# Patient Record
Sex: FEMALE | Race: WHITE
[De-identification: names, ages, dates, MRNs, and addresses within clinical notes are randomized per-mention and may not be internally consistent; named-entity substitution may affect disease eponyms.]

---

## 2017-01-27 ENCOUNTER — HOSPITAL ENCOUNTER (OUTPATIENT)
Dept: HOSPITAL 45 - C.LABBC | Age: 72
Discharge: HOME | End: 2017-01-27
Attending: INTERNAL MEDICINE
Payer: COMMERCIAL

## 2017-01-27 DIAGNOSIS — D64.9: Primary | ICD-10-CM

## 2017-01-27 LAB
ALBUMIN/GLOB SERPL: 1.3 {RATIO} (ref 0.9–2)
ALP SERPL-CCNC: 62 U/L (ref 45–117)
ALT SERPL-CCNC: 23 U/L (ref 12–78)
ANION GAP SERPL CALC-SCNC: 9 MMOL/L (ref 3–11)
AST SERPL-CCNC: 16 U/L (ref 15–37)
BASOPHILS # BLD: 0.01 K/UL (ref 0–0.2)
BASOPHILS NFR BLD: 0.3 %
BUN SERPL-MCNC: 18 MG/DL (ref 7–18)
BUN/CREAT SERPL: 21.6 (ref 10–20)
CALCIUM SERPL-MCNC: 9.5 MG/DL (ref 8.5–10.1)
CHLORIDE SERPL-SCNC: 105 MMOL/L (ref 98–107)
CO2 SERPL-SCNC: 25 MMOL/L (ref 21–32)
COMPLETE: YES
CREAT SERPL-MCNC: 0.81 MG/DL (ref 0.6–1.2)
EOSINOPHIL NFR BLD AUTO: 178 K/UL (ref 130–400)
FERRITIN SERPL-MCNC: 53.9 NG/ML (ref 8–388)
GLOBULIN SER-MCNC: 3.3 GM/DL (ref 2.5–4)
GLUCOSE SERPL-MCNC: 92 MG/DL (ref 70–99)
HCT VFR BLD CALC: 34.9 % (ref 37–47)
IG%: 0 %
IMM GRANULOCYTES NFR BLD AUTO: 34.9 %
LYMPHOCYTES # BLD: 1.32 K/UL (ref 1.2–3.4)
MCH RBC QN AUTO: 29.5 PG (ref 25–34)
MCHC RBC AUTO-ENTMCNC: 33 G/DL (ref 32–36)
MCV RBC AUTO: 89.5 FL (ref 80–100)
MONOCYTES NFR BLD: 6.1 %
NEUTROPHILS # BLD AUTO: 2.1 %
NEUTROPHILS NFR BLD AUTO: 56.6 %
PMV BLD AUTO: 9.4 FL (ref 7.4–10.4)
POTASSIUM SERPL-SCNC: 4.2 MMOL/L (ref 3.5–5.1)
RBC # BLD AUTO: 3.9 M/UL (ref 4.2–5.4)
SODIUM SERPL-SCNC: 139 MMOL/L (ref 136–145)
TIBC SERPL-MCNC: 368 MCG/DL (ref 250–450)
WBC # BLD AUTO: 3.78 K/UL (ref 4.8–10.8)

## 2017-01-31 NOTE — CODING QUERY MEDICAL NECESSITY
SUPPORTING DIAGNOSIS NEEDED





A supporting diagnosis is required for the test/procedure performed on this patient in 
order for us to be reimbursed by the patient's insurance. Please provide a supporting 
diagnosis for the following test/procedure listed below next to the test name along with 
your signature. 



*If there is no additional diagnosis for this patient that would support the following 
test/procedure please document that below next to the test/procedure.



Test(s)/Procedure(s) that require a supporting diagnosis:

DOS 1/27

* Vitamin B12      DIAGNOSIS:



Provider Signature:  ______________________________  Date:  _______



Thank you  

Penelope Solano

Health Information Management

Phone:  [phone]

Fax:  [phone]



Once completed, please kindly fax back to [phone]



For questions please call [phone]

## 2017-02-21 ENCOUNTER — HOSPITAL ENCOUNTER (OUTPATIENT)
Dept: HOSPITAL 45 - C.LABBC | Age: 72
Discharge: HOME | End: 2017-02-21
Attending: NURSE PRACTITIONER
Payer: COMMERCIAL

## 2017-02-21 DIAGNOSIS — R39.15: ICD-10-CM

## 2017-02-21 DIAGNOSIS — R31.29: Primary | ICD-10-CM

## 2017-02-28 ENCOUNTER — HOSPITAL ENCOUNTER (OUTPATIENT)
Dept: HOSPITAL 45 - C.LAB | Age: 72
Discharge: HOME | End: 2017-02-28
Attending: INTERNAL MEDICINE
Payer: COMMERCIAL

## 2017-02-28 DIAGNOSIS — K62.5: ICD-10-CM

## 2017-02-28 DIAGNOSIS — D64.9: Primary | ICD-10-CM

## 2017-02-28 LAB
BASOPHILS # BLD: 0 K/UL (ref 0–0.2)
BASOPHILS NFR BLD: 0 %
COMPLETE: YES
EOSINOPHIL NFR BLD AUTO: 160 K/UL (ref 130–400)
HCT VFR BLD CALC: 34.1 % (ref 37–47)
IG%: 0.2 %
IMM GRANULOCYTES NFR BLD AUTO: 31.7 %
LYMPHOCYTES # BLD: 1.29 K/UL (ref 1.2–3.4)
MCH RBC QN AUTO: 29.8 PG (ref 25–34)
MCHC RBC AUTO-ENTMCNC: 33.4 G/DL (ref 32–36)
MCV RBC AUTO: 89.3 FL (ref 80–100)
MONOCYTES NFR BLD: 6.6 %
NEUTROPHILS # BLD AUTO: 2 %
NEUTROPHILS NFR BLD AUTO: 59.5 %
PMV BLD AUTO: 8.7 FL (ref 7.4–10.4)
RBC # BLD AUTO: 3.82 M/UL (ref 4.2–5.4)
WBC # BLD AUTO: 4.07 K/UL (ref 4.8–10.8)

## 2017-03-02 ENCOUNTER — HOSPITAL ENCOUNTER (OUTPATIENT)
Dept: HOSPITAL 45 - C.GI | Age: 72
Discharge: HOME | End: 2017-03-02
Attending: INTERNAL MEDICINE
Payer: COMMERCIAL

## 2017-03-02 VITALS
HEIGHT: 65.98 IN | WEIGHT: 138.3 LBS | WEIGHT: 138.3 LBS | HEIGHT: 65.98 IN | BODY MASS INDEX: 22.23 KG/M2 | BODY MASS INDEX: 22.23 KG/M2

## 2017-03-02 VITALS — DIASTOLIC BLOOD PRESSURE: 72 MMHG | HEART RATE: 73 BPM | SYSTOLIC BLOOD PRESSURE: 157 MMHG | OXYGEN SATURATION: 100 %

## 2017-03-02 DIAGNOSIS — Z98.51: ICD-10-CM

## 2017-03-02 DIAGNOSIS — Z79.82: ICD-10-CM

## 2017-03-02 DIAGNOSIS — Z98.890: ICD-10-CM

## 2017-03-02 DIAGNOSIS — Z88.2: ICD-10-CM

## 2017-03-02 DIAGNOSIS — K52.0: Primary | ICD-10-CM

## 2017-03-02 DIAGNOSIS — Z83.79: ICD-10-CM

## 2017-03-02 NOTE — DISCHARGE INSTRUCTIONS
Endoscopy Patient Instructions


Date / Procedure(s) Performed


Mar 2, 2017.


Colonoscopy





Allergy Information


Coded Allergies:  


     Sulfa Antibiotics (Verified  Allergy, Unknown, HIVES AS A CHILD, 3/1/17)





Discharge Date / Findings


Mar 2, 2017.


Radiation induced Proctitis





Medication Instructions


Stopped Medication(s):  


ASA


Restart Stopped Medication(s):


OK to resume all medications today as prescribed


 Reported Home Medications








 Medications  Dose


 Route/Sig


 Max Daily Dose Days Date Category Dose


Instructions


 


 Vitamin D3


  (Cholecalciferol)


 2,000 Unit Cap  1 Cap


 PO QPM


   90 3/1/17 Reported 


 


 Stool Softener


  (Sennosides-Docusate


 Sodium) 1 Tab Tab  1 Tab


 PO QPM


    3/1/17 Reported  RECENTLY STOPPED TAKING


 


 Probiotic


  (Probiotic


 Product) 1 Cap Cap  1 Cap


 PO QPM


    3/1/17 Reported 


 


 Multivitamin


  (Multivitamins)


 Tab  1 Tab


 PO QPM


    3/1/17 Reported  RECENTLY STOPPED TAKING


 


 Iron (Ferrous


 Sulfate) 325 Mg


 Tab  1 Tab


 PO QPM


    3/1/17 Reported  RECENTLY STOPPED TAKING


 


 Aspirin Chewable


  (Aspirin) 81 Mg


 Chew  81 Mg


 PO QPM


    3/1/17 Reported  RECENTLY STOPPED TAKING


 


 Zocor


  (Simvastatin) 20


 Mg Tab  20 Mg


 PO QPM


    3/1/17 Reported 











Provider Instructions





Activity Restrictions





-  No exercising or heavy lifting for 24 hours. 


-  Do not drink alcohol the day of the procedure.


-  Do not drive a car or operate machinery until the day after the procedure.


-  Do not make any important decisions or sign important papers in 24 hours 

after the procedure.





Following Day:





-  Return to full activity which may include returning to work/school.





Diet





Start your diet with liquids and light foods (jello, soup, juice, toast).  Then 

eat your usual diet if not nauseated.





Treatment For Common After Affects





For mild abdominal pain, bloating, or excessive gas:





-  Rest


-  Eat lightly


-  Lie on right side





Follow-Up Information


Follow-up with Otilio Bowden as scheduled





Anesthesia Information





What You Should Know





You have had a procedure that required some medicine to reduce anxiety and 

discomfort. This treatment is called moderate sedation.  


After receiving the treatment, you may be sleepy, but you will be able to 

breathe on your own.  The effects of the treatment may last for several hours.








Follow these instructions along with Activity/Diet recommendations noted above:





*  Do NOT do anything where dizziness or clumsiness would be dangerous.





*  Rest quietly at home today, then you can be up and about tomorrow.





*  Have a responsible person stay with you the rest of today.





*  You may have had an I.V. today.  If so, you may take the dressing off later 

today.





Recommendations


 


Call your doctor if:





*  Trouble breathing 





*  Continuous vomiting for more than 24 hours





*  Temperature above 101 degrees





*  Severe abdominal pain or bloating





*  Pain not relieved by pain medicine ordered





*  There is increased drainage or redness from any incision





*  A large amount of rectal bleeding greater than 2-3 tablespoons. 


   (If you had a polyp/s removed or have hemorrhoids, a small amount of blood -


    from the rectum is to be expected.)





*  You have any unanswered questions or concerns.





IN THE EVENT OF A SERIOUS EMERGENCY, GO TO THE NEAREST EMERGENCY ROOM





       Your discharge instructions were prepared by provider Aristeo Ewing.


 Patient Instructions Signature Page








Melba Baker 











Patient (or Guardian) Signature/Date:____________________________________ I 

have read and understand the instructions given to me by my caregivers.








Caregiver/RN/Doctor Signature/Date:____________________________________








The above-named patient and/or guardian has received patient instructions on 

this date.


























+  Original Patient Signature Page (only) stays with chart.  Please make copy 

for patient.

## 2017-03-02 NOTE — ANESTHESIOLOGY PROGRESS NOTE
Anesthesia Post Op Note


Date & Time


Mar 2, 2017 at 17:12





Vital Signs





 Vital Signs Past 12 Hours








  Date Time  Temp Pulse Resp B/P Pulse Ox O2 Delivery O2 Flow Rate FiO2


 


3/2/17 17:01  70 16 161/67 100 Nasal Cannula 4 


 


3/2/17 15:25 36.6 86 16 145/81 100 Room Air  











Notes


Mental Status:  alert / awake / arousable, participated in evaluation


Pt Amnestic to Procedure:  Yes


Nausea / Vomiting:  adequately controlled


Pain:  adequately controlled


Airway Patency, RR, SpO2:  stable & adequate


BP & HR:  stable & adequate


Hydration State:  stable & adequate


Anesthetic Complications:  no major complications apparent

## 2017-03-02 NOTE — ENDO HISTORY AND PHYSICAL
History & Physical


Date of Service:


Mar 2, 2017.


Chief Complaint:


Rectal bleeding


Referring Physician:


Otilio Bowden


History of Present Illness


73 yo CF who presents for colonoscopy secondary to rectal bleeding.





Past Surgical History


Hx Cardiac Surgery:  No


Hx Internal Defibrillator:  No


Hx Pacemaker:  No


Hx Abdominal Surgery:  Yes (STIVEN, TUBAL LIGATION)


Hx of Implantable Prosthesis:  No


Hx Post-Op Nausea and Vomiting:  No


Hx Cancer Surgery:  No


Hx Thoracic Surgery:  No


Hx Orthopedic:  No


Hx Urinary Tract Surgery:  No





Family History


IBD





Social History


Hx Substance Use:  No


Hx Alcohol Use:  Yes (OCCASSIONALLY)





Allergies


Coded Allergies:  


     Sulfa Antibiotics (Verified  Allergy, Unknown, HIVES AS A CHILD, 3/1/17)





Current Medications





 Reported Home Medications








 Medications  Dose


 Route/Sig


 Max Daily Dose Days Date Category Dose


Instructions


 


 Vitamin D3


  (Cholecalciferol)


 2,000 Unit Cap  1 Cap


 PO QPM


   90 3/1/17 Reported 


 


 Stool Softener


  (Sennosides-Docusate


 Sodium) 1 Tab Tab  1 Tab


 PO QPM


    3/1/17 Reported  RECENTLY STOPPED TAKING


 


 Probiotic


  (Probiotic


 Product) 1 Cap Cap  1 Cap


 PO QPM


    3/1/17 Reported 


 


 Multivitamin


  (Multivitamins)


 Tab  1 Tab


 PO QPM


    3/1/17 Reported  RECENTLY STOPPED TAKING


 


 Iron (Ferrous


 Sulfate) 325 Mg


 Tab  1 Tab


 PO QPM


    3/1/17 Reported  RECENTLY STOPPED TAKING


 


 Aspirin Chewable


  (Aspirin) 81 Mg


 Chew  81 Mg


 PO QPM


    3/1/17 Reported  RECENTLY STOPPED TAKING


 


 Zocor


  (Simvastatin) 20


 Mg Tab  20 Mg


 PO QPM


    3/1/17 Reported 











Vital Signs


Weight (Kilograms):  62.73


Height (Feet):  5


Height (Inches):  6











  Date Time  Temp Pulse Resp B/P Pulse Ox O2 Delivery O2 Flow Rate FiO2


 


3/2/17 15:25 36.6 86 16 145/81 100 Room Air  











Physical Exam


General Appearance:  WD/WN, no apparent distress


Respiratory/Chest:  


   Auscultation:  breath sounds normal


Cardiovascular:  


   Heart Auscultation:  RRR


Abdomen:  


   Bowel Sounds:  normal


   Inspection & Palpation:  soft, non-distended, no tenderness, guarding & 

rebound





Assessment and Plan


Assessment:


73 yo CF who presents for colonoscopy secondary to rectal bleeding.








Plan:


Proceed with colonoscopy.

## 2017-03-03 NOTE — GI REPORT
Procedure Date: 3/2/2017 4:39 PM

Procedure:            Colonoscopy

Indications:          Rectal bleeding

Medicines:            Monitored Anesthesia Care

Complications:        No immediate complications.

Estimated Blood Loss: Estimated blood loss: none.

Procedure:            Pre-Anesthesia Assessment:

                      - Prior to the procedure, a History and Physical was 

                      performed, and patient medications and allergies were 

                      reviewed. The patient's tolerance of previous 

                      anesthesia was also reviewed. The risks and benefits of 

                      the procedure and the sedation options and risks were 

                      discussed with the patient. All questions were 

                      answered, and informed consent was obtained. Prior 

                      Anticoagulants: The patient has taken aspirin, last 

                      dose was 4 days prior to procedure. ASA Grade 

                      Assessment: II - A patient with mild systemic disease. 

                      After reviewing the risks and benefits, the patient was 

                      deemed in satisfactory condition to undergo the 

                      procedure.

                      After I obtained informed consent, the scope was passed 

                      under direct vision. Throughout the procedure, the 

                      patient's blood pressure, pulse, and oxygen saturations 

                      were monitored continuously. The scope was introduced 

                      through the anus and advanced to the terminal ileum. 

                      The colonoscopy was performed without difficulty. The 

                      patient tolerated the procedure well. The quality of 

                      the bowel preparation was good. The terminal ileum, 

                      ileocecal valve, appendiceal orifice, and rectum were 

                      photographed.

Findings:

     The perianal exam findings include a perianal rash.

     Localized moderate inflammation characterized by erythema and loss of 

     vascularity was found in the rectum.

Impression:           - Perianal rash found on perianal exam.

                      - Localized moderate inflammation was found in the 

                      rectum secondary to radiation proctitis.

                      - No specimens collected.

Recommendation:       - Resume previous diet.

                      - Continue present medications.

                      - No repeat colonoscopy due to age and the absence of 

                      advanced adenomas.

                      - Return to primary care physician as previously 

                      scheduled.

                      - Recommend Zinc Oxide paste to excoriated anal mucosa 

                      Twice daily

                      - Consider wound care consult if symptoms persist

Aristeo Ewing DO

3/2/2017 5:06:25 PM

This report has been signed electronically.

Note Initiated On: 3/2/2017 4:39 PM

     I attest to the content of the Intraoperative Record and orders 

     documented therein, exceptions below

## 2017-03-08 ENCOUNTER — HOSPITAL ENCOUNTER (OUTPATIENT)
Dept: HOSPITAL 45 - C.LAB | Age: 72
Discharge: HOME | End: 2017-03-08
Attending: NURSE PRACTITIONER
Payer: COMMERCIAL

## 2017-03-08 DIAGNOSIS — R39.15: ICD-10-CM

## 2017-03-08 DIAGNOSIS — N39.0: Primary | ICD-10-CM

## 2017-03-11 ENCOUNTER — HOSPITAL ENCOUNTER (OUTPATIENT)
Dept: HOSPITAL 45 - C.LAB | Age: 72
End: 2017-03-11
Attending: INTERNAL MEDICINE
Payer: COMMERCIAL

## 2017-03-11 DIAGNOSIS — R19.7: Primary | ICD-10-CM

## 2017-04-21 ENCOUNTER — HOSPITAL ENCOUNTER (OUTPATIENT)
Dept: HOSPITAL 45 - C.PAPS | Age: 72
Discharge: HOME | End: 2017-04-21
Attending: OBSTETRICS & GYNECOLOGY
Payer: COMMERCIAL

## 2017-04-21 DIAGNOSIS — Z01.411: Primary | ICD-10-CM

## 2017-05-18 ENCOUNTER — HOSPITAL ENCOUNTER (OUTPATIENT)
Dept: HOSPITAL 45 - C.MAMM | Age: 72
Discharge: HOME | End: 2017-05-18
Attending: OBSTETRICS & GYNECOLOGY
Payer: COMMERCIAL

## 2017-05-18 DIAGNOSIS — Z12.31: Primary | ICD-10-CM

## 2017-05-18 NOTE — MAMMOGRAPHY REPORT
BILATERAL DIGITAL SCREENING MAMMOGRAM WITH CAD: 5/18/2017

CLINICAL HISTORY: Routine screening.  Patient has no complaints.  





TECHNIQUE:  Current study was also evaluated with a Computer Aided Detection (CAD) system.  Bilatera
l CC and MLO views were obtained.



COMPARISON: Comparison is made to exams dated:  4/11/2016 mammogram, 4/8/2015 mammogram, 3/23/2013 m
ammogram, 3/25/2014 mammogram, 3/23/2012 mammogram, and 3/23/2011 mammogram.   



BREAST COMPOSITION:  The tissue of both breasts is heterogeneously dense, which may obscure small ma
sses.  



FINDINGS:  No suspicious masses, calcifications, or areas of architectural distortion are noted in e
ither breast. There has been no significant interval change compared to prior exams.  Linear scar ma
rkers denote scars on the right superior breast.  Scattered bilateral benign-appearing calcification
s are stable.  Asymmetries in the left medial breast on the cc view are stable compared to prior exa
ms including the 2010 exam.





IMPRESSION:  ACR BI-RADS CATEGORY 2: BENIGN

There is no mammographic evidence of malignancy. A 1 year screening mammogram is recommended.  The p
atient will receive written notification of the results.  





Approximately 10% of breast cancers are not detected with mammography. A negative mammographic repor
t should not delay biopsy if a clinically suggestive mass is present.



Gabrielle Lea M.D.          

/:5/18/2017 15:28:12  



Imaging Technologist: Jolynn Gonzales, [facility]

letter sent: Normal 1/2  

BI-RADS Code: ACR BI-RADS Category 2: Benign

## 2017-07-03 ENCOUNTER — HOSPITAL ENCOUNTER (OUTPATIENT)
Dept: HOSPITAL 45 - C.LAB | Age: 72
Discharge: HOME | End: 2017-07-03
Attending: UROLOGY
Payer: COMMERCIAL

## 2017-07-03 DIAGNOSIS — R30.0: Primary | ICD-10-CM

## 2018-01-31 ENCOUNTER — HOSPITAL ENCOUNTER (OUTPATIENT)
Dept: HOSPITAL 45 - C.LABBC | Age: 73
Discharge: HOME | End: 2018-01-31
Attending: NURSE PRACTITIONER
Payer: COMMERCIAL

## 2018-01-31 DIAGNOSIS — E78.5: ICD-10-CM

## 2018-01-31 DIAGNOSIS — D64.9: Primary | ICD-10-CM

## 2018-01-31 LAB
ALBUMIN SERPL-MCNC: 3.9 GM/DL (ref 3.4–5)
ALP SERPL-CCNC: 78 U/L (ref 45–117)
ALT SERPL-CCNC: 24 U/L (ref 12–78)
AST SERPL-CCNC: 18 U/L (ref 15–37)
BASOPHILS # BLD: 0.01 K/UL (ref 0–0.2)
BASOPHILS NFR BLD: 0.2 %
BUN SERPL-MCNC: 29 MG/DL (ref 7–18)
CALCIUM SERPL-MCNC: 9.2 MG/DL (ref 8.5–10.1)
CO2 SERPL-SCNC: 24 MMOL/L (ref 21–32)
CREAT SERPL-MCNC: 0.99 MG/DL (ref 0.6–1.2)
EOS ABS #: 0.14 K/UL (ref 0–0.5)
EOSINOPHIL NFR BLD AUTO: 189 K/UL (ref 130–400)
GLUCOSE SERPL-MCNC: 134 MG/DL (ref 70–99)
HCT VFR BLD CALC: 37.6 % (ref 37–47)
HGB BLD-MCNC: 12.2 G/DL (ref 12–16)
IG#: 0 K/UL (ref 0–0.02)
IMM GRANULOCYTES NFR BLD AUTO: 29.2 %
KETONES UR QL STRIP: 93 MG/DL
LYMPHOCYTES # BLD: 1.4 K/UL (ref 1.2–3.4)
MCH RBC QN AUTO: 28.8 PG (ref 25–34)
MCHC RBC AUTO-ENTMCNC: 32.4 G/DL (ref 32–36)
MCV RBC AUTO: 88.9 FL (ref 80–100)
MONO ABS #: 0.33 K/UL (ref 0.11–0.59)
MONOCYTES NFR BLD: 6.9 %
NEUT ABS #: 2.91 K/UL (ref 1.4–6.5)
NEUTROPHILS # BLD AUTO: 2.9 %
NEUTROPHILS NFR BLD AUTO: 60.8 %
PH UR: 193 MG/DL (ref 0–200)
PMV BLD AUTO: 9.4 FL (ref 7.4–10.4)
POTASSIUM SERPL-SCNC: 4.4 MMOL/L (ref 3.5–5.1)
PROT SERPL-MCNC: 7.8 GM/DL (ref 6.4–8.2)
RED CELL DISTRIBUTION WIDTH CV: 13.7 % (ref 11.5–14.5)
RED CELL DISTRIBUTION WIDTH SD: 44.5 FL (ref 36.4–46.3)
SODIUM SERPL-SCNC: 137 MMOL/L (ref 136–145)
WBC # BLD AUTO: 4.79 K/UL (ref 4.8–10.8)

## 2018-02-08 ENCOUNTER — HOSPITAL ENCOUNTER (OUTPATIENT)
Dept: HOSPITAL 45 - C.LABBC | Age: 73
Discharge: HOME | End: 2018-02-08
Attending: NURSE PRACTITIONER
Payer: COMMERCIAL

## 2018-02-08 DIAGNOSIS — N39.0: Primary | ICD-10-CM

## 2018-02-08 DIAGNOSIS — R73.01: ICD-10-CM

## 2018-02-08 LAB — HBA1C MFR BLD: 6 % (ref 4.5–5.6)

## 2018-03-01 ENCOUNTER — HOSPITAL ENCOUNTER (OUTPATIENT)
Dept: HOSPITAL 45 - C.LABBC | Age: 73
Discharge: HOME | End: 2018-03-01
Attending: NURSE PRACTITIONER
Payer: COMMERCIAL

## 2018-03-01 DIAGNOSIS — R39.15: Primary | ICD-10-CM

## 2018-03-13 ENCOUNTER — HOSPITAL ENCOUNTER (OUTPATIENT)
Dept: HOSPITAL 45 - C.LABBC | Age: 73
Discharge: HOME | End: 2018-03-13
Attending: NURSE PRACTITIONER
Payer: COMMERCIAL

## 2018-03-13 DIAGNOSIS — R39.15: Primary | ICD-10-CM

## 2018-04-16 ENCOUNTER — HOSPITAL ENCOUNTER (OUTPATIENT)
Dept: HOSPITAL 45 - C.LABSPEC | Age: 73
Discharge: HOME | End: 2018-04-16
Attending: NURSE PRACTITIONER
Payer: COMMERCIAL

## 2018-04-16 DIAGNOSIS — R39.15: Primary | ICD-10-CM

## 2018-05-09 ENCOUNTER — HOSPITAL ENCOUNTER (OUTPATIENT)
Dept: HOSPITAL 45 - C.LABSPEC | Age: 73
Discharge: HOME | End: 2018-05-09
Attending: UROLOGY
Payer: COMMERCIAL

## 2018-05-09 DIAGNOSIS — R39.15: Primary | ICD-10-CM

## 2018-05-21 ENCOUNTER — HOSPITAL ENCOUNTER (OUTPATIENT)
Dept: HOSPITAL 45 - C.LAB1850 | Age: 73
Discharge: HOME | End: 2018-05-21
Attending: NURSE PRACTITIONER
Payer: COMMERCIAL

## 2018-05-21 DIAGNOSIS — N39.0: Primary | ICD-10-CM

## 2018-07-26 ENCOUNTER — HOSPITAL ENCOUNTER (OUTPATIENT)
Dept: HOSPITAL 45 - C.LABBC | Age: 73
Discharge: HOME | End: 2018-07-26
Attending: NURSE PRACTITIONER
Payer: COMMERCIAL

## 2018-07-26 DIAGNOSIS — E78.5: ICD-10-CM

## 2018-07-26 DIAGNOSIS — R73.01: Primary | ICD-10-CM

## 2018-07-26 LAB
ALBUMIN SERPL-MCNC: 3.9 GM/DL (ref 3.4–5)
ALP SERPL-CCNC: 66 U/L (ref 45–117)
ALT SERPL-CCNC: 21 U/L (ref 12–78)
AST SERPL-CCNC: 16 U/L (ref 15–37)
BUN SERPL-MCNC: 21 MG/DL (ref 7–18)
CALCIUM SERPL-MCNC: 8.9 MG/DL (ref 8.5–10.1)
CO2 SERPL-SCNC: 25 MMOL/L (ref 21–32)
CREAT SERPL-MCNC: 0.93 MG/DL (ref 0.6–1.2)
GLUCOSE SERPL-MCNC: 94 MG/DL (ref 70–99)
HBA1C MFR BLD: 5.9 % (ref 4.5–5.6)
POTASSIUM SERPL-SCNC: 3.8 MMOL/L (ref 3.5–5.1)
PROT SERPL-MCNC: 7.4 GM/DL (ref 6.4–8.2)
SODIUM SERPL-SCNC: 140 MMOL/L (ref 136–145)